# Patient Record
Sex: FEMALE | Race: WHITE | Employment: FULL TIME | URBAN - METROPOLITAN AREA
[De-identification: names, ages, dates, MRNs, and addresses within clinical notes are randomized per-mention and may not be internally consistent; named-entity substitution may affect disease eponyms.]

---

## 2024-09-03 NOTE — PROGRESS NOTES
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 She is here today with her mother. She unfortunately learned of the biopsy results via [employer] when they were just automatically released, oftentimes before the ordering physicians have even seen the results. She was able to have a conversation with Dr. Webb and then was referred on to GYN oncology. She is not interested in future fertility. She lives alone but will be able to have support from her parents during her postoperative recovery and will be able to stay with them. Patient's mother is present with her at today's visit. Pet therapy dog, Wild, was present during today's visit.    Genetic Testing  dMMR  p53 wild-type    Review of Systems:  A complete 14-point review of systems is negative except as noted in the above history of present illness.    Past Medical History:  Endometrial carcinoma  Abnormal uterine bleeding  Irregular menstrual cycle    Surgical History:   Denies any previous surgery    OB/Gyn History:    Menarche age 15.  Premenopausal  Reports irregular menstrual cycles that have worsened in irregularity.  Denies any history of abnormal pap smear.    Allergies#  NKA    Medications:       Family History:  Prostate cancer x 2: paternal uncle, alive                              maternal grandfather (and MDS). Now .    Social History:  Smoking Status: half pack of cigarettes use off and on throughout the  year.   Alcohol consumption: socially.  She lives alone in a townhouse in [place], [place].  She works in document control.     Health Maintenance:  Last pap smear: 2024  Last mammogram:   Last colonoscopy:     Vital Signs:  Blood pressure: 118/70, Pulse: 101, Temperature: 97 F, Respirations: 16, O2 sat: 97%, Pain Scale: 0, Height: 70 in, Weight: 276.6 lb, BSA: 2.4, BMI: 39.69 kg/m2    Physical Exam (Gyn Oncology):  General: alert, cooperative, no acute distress  HEENT: normocephalic, trachea midline, no thyromegaly  Lungs: no labored breathing on room air.  Cardiac: RRR  Abdomen: soft, non-distended, non-tender. No surgical scars.  Extremities: no edema, non-tender  Neurologic Exam: no focal deficits  Psych: normal mood and affect  Pelvic: deferred    Laboratory Data:  Endometrial biopsy on 2024:  Final diagnosis:   1. FIGO grade 2 endometrioid endometrial carcinoma.            Imaging:  CT scan on 2024:   IMPRESSION:  1. No acute findings in the chest, abdomen or pelvis.  2. Probable fibroid uterus.  3. Benign fat-containing lesion right adrenal gland.    Ultrasound pelvis on 2024:   Impression:   Uterus: Anteverted uterus measuring 7.3 x 4.3 x 4.8 cm. Lower uterine intramural fibroid measuring 1.1 x 1.0 x 0.8 cm. Left fundal intramural fibroid measuring 2.3 x 1.9 x 1.9 cm.  Endometrium: Endometrial stripe measures 13 mm. No endometrial mass or polyp identified.  Right ovary: Measures 2.3 x 1.2 x 1.0 cm. Intact blood flow. No suspicious lesions identified.  Left ovary: Measures 2.1 x 1.4 x 1.8 cm. Intact blood flow. No suspicious lesions identified.  No pelvic free fluid.    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    1. FIGO grade 2 endometrioid endometrial adenocarcinoma  (dMMR).  Briefly discussed natural history of endometrial cancer  Discussed hysterectomy as gold standard of care for endometrial cancer  We discussed the need for genetics referral for all women < 49 yo with new diagnosis of endometrial cancer. She is also dMMR. She warrants consultation with/without MLH1 promoter methylation results. A referral was placed at today's visit. Patient is very interested in pursuing genetic testing and is aware can do that at the time of her consultation (after the visit).   Discussed fertility preservation is only a possible alternative for precancer (EIN) and/or FIGO grade 1 endometrioid endometrial adenocarcinoma  Discussed DELBERT consultation / referral for other alternatives such as egg retrieval and future gestational carrier for genetic offspring, etc. Patient feels comfortable with proceeding with surgery. She is not interested in DELBERT consultation.  Discussed minimally invasive surgery with robotic approach.  Reviewed risks, benefits and alternatives to surgery for treatment of endometrial cancer  Reviewed alternatives for treatment can include medical management, but, this would NOT be advised with FIGO grade 2 histotype.   Discussed benefits including: therapeutic removal of cancer, ability to pathologically stage, ability to inform prognosis and ability to possibly inform future adjuvant therapies.  Discussed risks including, but not limited: bleeding, infection, injury to surrounding structures, postoperative pain/discomfort, future hernia formation, failure to cure, possible conversion to laparotomy in event of brisk bleeding and/or dense scar tissue, increased risk of blood clots, risk of lymphedema with removal of lymph nodes, permanent sterility and immediate/irreversible surgical menopause  Recovery from the surgical procedure discussed.  Postsurgical precautions discussed.  Patient completed formal surgical education with RN team at today's visit  Patient had the  "opportunity to meet with our surgical scheduling team at today's visit  We discussed permanent sterility. Aware she will no longer able to bear children, as well as undergo permanent surgical menopause.  We will await final pathology and final stage to determine if she can benefit from any future hormone replacement therapy. This could be with single agent estrogen in a transdermal formulation, but was advised of symptoms and detrimental health effects from menopause and was advised of permanent nature of removal of these organs and inability to bear future children.   She and her mother feel comfortable with the plan of care  All questions answered to her and her mother's satisfaction at today's visit    2. Medical comorbidities:  Personal history of endometrial carcinoma (new diagnosis), abnormal uterine bleeding, irregular menstrual cycle    3. Preoperative considerations        Diagnosis Code: C54.1  Surgery: \"robotic-assisted total laparoscopic hysterectomy, bilateral salpingo-oophorectomy, bilateral sentinel lymph node dissections, pelvic washings\"  Estimated Total Time: 120 minutes  Anesthesia: GENERAL  Alert Pathology for Frozen Section: NO  Bowel Prep: NO  Joint Case: NO  Patient Status: Same-Day Discharge  Preoperative Clearance Visit: YES  Labs on Day of Surgery: T&S & Hgb  Surgical Antibiotic Prophylaxis: IV Cefazolin 2 grams + IV Metronidazole 500 milligrams  VTE Prophylaxis: SQ Heparin 5,000 units + bilateral SCDs  Additional Considerations: will undergo surgical menopause - discussed possibility of postoperative HRT once final pathology is reviewed    Treatment Plan and Consent  Reviewed role of surgery for a definitive treatment and staging of endometrial cancer with the patient and her mother today. See counseling above. Specifically, the counseling included: goals of the treatment, prognosis, frequency, intended benefits, associated risks/harms and side effects and medically reasonable " alternatives.    I spent a total of 60 minutes of cumulative time in care of this patient, which included >50% of time spent in direct patient care which included patient interview, examination, and treatment counseling. The remainder of the time was spent in medical documentation, review of records including US / CT imaging, pathology report and care coordination.    I provided the patient an opportunity to ask questions and I answered all of their treatment related questions to the best of my ability. The patient expressed understanding and consent to this plan of care.      Pain Care Management:  Pain Scale: 0      The patient and family/friends if present were apprised of the use of [employer] remote documentation service and all parties consented to conducting the visit in this manner.    Documentation assistance provided by daniel Mason for Araceli Cabral MD on 07/31/2024.    I, Araceli Cabral MD,  personally performed the services described in this documentation, and it is both accurate and complete.      Araceli Cabral MD  Phone: [phone]  Fax: [phone]

## 2024-09-04 ENCOUNTER — ANESTHESIA EVENT (OUTPATIENT)
Dept: SURGERY | Facility: CLINIC | Age: 44
End: 2024-09-04
Payer: COMMERCIAL

## 2024-09-05 ENCOUNTER — ANESTHESIA (OUTPATIENT)
Dept: SURGERY | Facility: CLINIC | Age: 44
End: 2024-09-05
Payer: COMMERCIAL

## 2024-09-05 ENCOUNTER — HOSPITAL ENCOUNTER (OUTPATIENT)
Facility: CLINIC | Age: 44
Discharge: HOME | End: 2024-09-05
Attending: OBSTETRICS & GYNECOLOGY | Admitting: OBSTETRICS & GYNECOLOGY
Payer: COMMERCIAL

## 2024-09-05 VITALS
BODY MASS INDEX: 40.88 KG/M2 | TEMPERATURE: 98 F | HEART RATE: 80 BPM | RESPIRATION RATE: 16 BRPM | OXYGEN SATURATION: 97 % | SYSTOLIC BLOOD PRESSURE: 128 MMHG | HEIGHT: 69 IN | DIASTOLIC BLOOD PRESSURE: 70 MMHG | WEIGHT: 276 LBS

## 2024-09-05 DIAGNOSIS — C54.1 MALIGNANT NEOPLASM OF ENDOMETRIUM (H): Primary | ICD-10-CM

## 2024-09-05 LAB
ABO/RH(D): NORMAL
ANTIBODY SCREEN: NEGATIVE
HCG UR QL: NEGATIVE
HGB BLD-MCNC: 11.7 G/DL (ref 11.7–15.7)
SPECIMEN EXPIRATION DATE: NORMAL

## 2024-09-05 PROCEDURE — 258N000003 HC RX IP 258 OP 636: Performed by: NURSE ANESTHETIST, CERTIFIED REGISTERED

## 2024-09-05 PROCEDURE — 250N000011 HC RX IP 250 OP 636: Performed by: OBSTETRICS & GYNECOLOGY

## 2024-09-05 PROCEDURE — 272N000001 HC OR GENERAL SUPPLY STERILE: Performed by: OBSTETRICS & GYNECOLOGY

## 2024-09-05 PROCEDURE — 250N000011 HC RX IP 250 OP 636: Performed by: ANESTHESIOLOGY

## 2024-09-05 PROCEDURE — 38572 LAPAROSCOPY LYMPHADENECTOMY: CPT | Performed by: ANESTHESIOLOGY

## 2024-09-05 PROCEDURE — 86900 BLOOD TYPING SEROLOGIC ABO: CPT | Performed by: PHYSICIAN ASSISTANT

## 2024-09-05 PROCEDURE — 250N000011 HC RX IP 250 OP 636: Performed by: NURSE ANESTHETIST, CERTIFIED REGISTERED

## 2024-09-05 PROCEDURE — 710N000012 HC RECOVERY PHASE 2, PER MINUTE: Performed by: OBSTETRICS & GYNECOLOGY

## 2024-09-05 PROCEDURE — 250N000025 HC SEVOFLURANE, PER MIN: Performed by: OBSTETRICS & GYNECOLOGY

## 2024-09-05 PROCEDURE — 88305 TISSUE EXAM BY PATHOLOGIST: CPT | Mod: 26 | Performed by: PATHOLOGY

## 2024-09-05 PROCEDURE — 258N000003 HC RX IP 258 OP 636: Performed by: ANESTHESIOLOGY

## 2024-09-05 PROCEDURE — 258N000001 HC RX 258: Performed by: OBSTETRICS & GYNECOLOGY

## 2024-09-05 PROCEDURE — 88112 CYTOPATH CELL ENHANCE TECH: CPT | Mod: 26 | Performed by: PATHOLOGY

## 2024-09-05 PROCEDURE — 250N000013 HC RX MED GY IP 250 OP 250 PS 637: Performed by: PHYSICIAN ASSISTANT

## 2024-09-05 PROCEDURE — 999N000141 HC STATISTIC PRE-PROCEDURE NURSING ASSESSMENT: Performed by: OBSTETRICS & GYNECOLOGY

## 2024-09-05 PROCEDURE — 38572 LAPAROSCOPY LYMPHADENECTOMY: CPT | Performed by: NURSE ANESTHETIST, CERTIFIED REGISTERED

## 2024-09-05 PROCEDURE — 710N000009 HC RECOVERY PHASE 1, LEVEL 1, PER MIN: Performed by: OBSTETRICS & GYNECOLOGY

## 2024-09-05 PROCEDURE — 88307 TISSUE EXAM BY PATHOLOGIST: CPT | Mod: 26 | Performed by: STUDENT IN AN ORGANIZED HEALTH CARE EDUCATION/TRAINING PROGRAM

## 2024-09-05 PROCEDURE — 360N000080 HC SURGERY LEVEL 7, PER MIN: Performed by: OBSTETRICS & GYNECOLOGY

## 2024-09-05 PROCEDURE — 250N000009 HC RX 250: Performed by: OBSTETRICS & GYNECOLOGY

## 2024-09-05 PROCEDURE — 36415 COLL VENOUS BLD VENIPUNCTURE: CPT | Performed by: PHYSICIAN ASSISTANT

## 2024-09-05 PROCEDURE — 88309 TISSUE EXAM BY PATHOLOGIST: CPT | Mod: TC | Performed by: OBSTETRICS & GYNECOLOGY

## 2024-09-05 PROCEDURE — 88309 TISSUE EXAM BY PATHOLOGIST: CPT | Mod: 26 | Performed by: STUDENT IN AN ORGANIZED HEALTH CARE EDUCATION/TRAINING PROGRAM

## 2024-09-05 PROCEDURE — 85018 HEMOGLOBIN: CPT | Performed by: PHYSICIAN ASSISTANT

## 2024-09-05 PROCEDURE — 250N000011 HC RX IP 250 OP 636: Performed by: PHYSICIAN ASSISTANT

## 2024-09-05 PROCEDURE — 81025 URINE PREGNANCY TEST: CPT | Performed by: PHYSICIAN ASSISTANT

## 2024-09-05 PROCEDURE — 370N000017 HC ANESTHESIA TECHNICAL FEE, PER MIN: Performed by: OBSTETRICS & GYNECOLOGY

## 2024-09-05 PROCEDURE — 88305 TISSUE EXAM BY PATHOLOGIST: CPT | Mod: TC | Performed by: OBSTETRICS & GYNECOLOGY

## 2024-09-05 PROCEDURE — 250N000009 HC RX 250: Performed by: NURSE ANESTHETIST, CERTIFIED REGISTERED

## 2024-09-05 RX ORDER — HEPARIN SODIUM 5000 [USP'U]/.5ML
5000 INJECTION, SOLUTION INTRAVENOUS; SUBCUTANEOUS
Status: COMPLETED | OUTPATIENT
Start: 2024-09-05 | End: 2024-09-05

## 2024-09-05 RX ORDER — ONDANSETRON 2 MG/ML
4 INJECTION INTRAMUSCULAR; INTRAVENOUS EVERY 30 MIN PRN
Status: DISCONTINUED | OUTPATIENT
Start: 2024-09-05 | End: 2024-09-05 | Stop reason: HOSPADM

## 2024-09-05 RX ORDER — ONDANSETRON 4 MG/1
4 TABLET, ORALLY DISINTEGRATING ORAL EVERY 30 MIN PRN
Status: DISCONTINUED | OUTPATIENT
Start: 2024-09-05 | End: 2024-09-05 | Stop reason: HOSPADM

## 2024-09-05 RX ORDER — CEFAZOLIN SODIUM/WATER 2 G/20 ML
2 SYRINGE (ML) INTRAVENOUS
Status: COMPLETED | OUTPATIENT
Start: 2024-09-05 | End: 2024-09-05

## 2024-09-05 RX ORDER — OXYCODONE HYDROCHLORIDE 5 MG/1
10 TABLET ORAL
Status: DISCONTINUED | OUTPATIENT
Start: 2024-09-05 | End: 2024-09-05 | Stop reason: HOSPADM

## 2024-09-05 RX ORDER — OXYCODONE HYDROCHLORIDE 5 MG/1
5-10 TABLET ORAL EVERY 4 HOURS PRN
Qty: 10 TABLET | Refills: 0 | Status: SHIPPED | OUTPATIENT
Start: 2024-09-05

## 2024-09-05 RX ORDER — PROPOFOL 10 MG/ML
INJECTION, EMULSION INTRAVENOUS CONTINUOUS PRN
Status: DISCONTINUED | OUTPATIENT
Start: 2024-09-05 | End: 2024-09-05

## 2024-09-05 RX ORDER — FENTANYL CITRATE 0.05 MG/ML
50 INJECTION, SOLUTION INTRAMUSCULAR; INTRAVENOUS EVERY 5 MIN PRN
Status: DISCONTINUED | OUTPATIENT
Start: 2024-09-05 | End: 2024-09-05 | Stop reason: HOSPADM

## 2024-09-05 RX ORDER — OXYCODONE HYDROCHLORIDE 5 MG/1
5 TABLET ORAL
Status: COMPLETED | OUTPATIENT
Start: 2024-09-05 | End: 2024-09-05

## 2024-09-05 RX ORDER — ACETAMINOPHEN 325 MG/1
975 TABLET ORAL ONCE
Status: COMPLETED | OUTPATIENT
Start: 2024-09-05 | End: 2024-09-05

## 2024-09-05 RX ORDER — HYDROMORPHONE HCL IN WATER/PF 6 MG/30 ML
0.2 PATIENT CONTROLLED ANALGESIA SYRINGE INTRAVENOUS EVERY 5 MIN PRN
Status: DISCONTINUED | OUTPATIENT
Start: 2024-09-05 | End: 2024-09-05 | Stop reason: HOSPADM

## 2024-09-05 RX ORDER — MEPERIDINE HYDROCHLORIDE 25 MG/ML
12.5 INJECTION INTRAMUSCULAR; INTRAVENOUS; SUBCUTANEOUS EVERY 5 MIN PRN
Status: DISCONTINUED | OUTPATIENT
Start: 2024-09-05 | End: 2024-09-05 | Stop reason: HOSPADM

## 2024-09-05 RX ORDER — ONDANSETRON 2 MG/ML
INJECTION INTRAMUSCULAR; INTRAVENOUS PRN
Status: DISCONTINUED | OUTPATIENT
Start: 2024-09-05 | End: 2024-09-05

## 2024-09-05 RX ORDER — NALOXONE HYDROCHLORIDE 0.4 MG/ML
0.1 INJECTION, SOLUTION INTRAMUSCULAR; INTRAVENOUS; SUBCUTANEOUS
Status: DISCONTINUED | OUTPATIENT
Start: 2024-09-05 | End: 2024-09-05 | Stop reason: HOSPADM

## 2024-09-05 RX ORDER — DEXAMETHASONE SODIUM PHOSPHATE 4 MG/ML
4 INJECTION, SOLUTION INTRA-ARTICULAR; INTRALESIONAL; INTRAMUSCULAR; INTRAVENOUS; SOFT TISSUE
Status: DISCONTINUED | OUTPATIENT
Start: 2024-09-05 | End: 2024-09-05 | Stop reason: HOSPADM

## 2024-09-05 RX ORDER — CEFAZOLIN SODIUM/WATER 2 G/20 ML
2 SYRINGE (ML) INTRAVENOUS SEE ADMIN INSTRUCTIONS
Status: DISCONTINUED | OUTPATIENT
Start: 2024-09-05 | End: 2024-09-05 | Stop reason: HOSPADM

## 2024-09-05 RX ORDER — PROPOFOL 10 MG/ML
INJECTION, EMULSION INTRAVENOUS PRN
Status: DISCONTINUED | OUTPATIENT
Start: 2024-09-05 | End: 2024-09-05

## 2024-09-05 RX ORDER — OXYCODONE HYDROCHLORIDE 5 MG/1
5 TABLET ORAL
Status: DISCONTINUED | OUTPATIENT
Start: 2024-09-05 | End: 2024-09-05 | Stop reason: HOSPADM

## 2024-09-05 RX ORDER — INDOCYANINE GREEN AND WATER 25 MG
KIT INJECTION PRN
Status: DISCONTINUED | OUTPATIENT
Start: 2024-09-05 | End: 2024-09-05 | Stop reason: HOSPADM

## 2024-09-05 RX ORDER — BUPIVACAINE HYDROCHLORIDE 5 MG/ML
INJECTION, SOLUTION EPIDURAL; INTRACAUDAL
Status: DISCONTINUED
Start: 2024-09-05 | End: 2024-09-05 | Stop reason: HOSPADM

## 2024-09-05 RX ORDER — BUPIVACAINE HYDROCHLORIDE 5 MG/ML
INJECTION, SOLUTION EPIDURAL; INTRACAUDAL PRN
Status: DISCONTINUED | OUTPATIENT
Start: 2024-09-05 | End: 2024-09-05 | Stop reason: HOSPADM

## 2024-09-05 RX ORDER — LIDOCAINE HYDROCHLORIDE 20 MG/ML
INJECTION, SOLUTION INFILTRATION; PERINEURAL PRN
Status: DISCONTINUED | OUTPATIENT
Start: 2024-09-05 | End: 2024-09-05

## 2024-09-05 RX ORDER — HYDROMORPHONE HCL IN WATER/PF 6 MG/30 ML
0.4 PATIENT CONTROLLED ANALGESIA SYRINGE INTRAVENOUS EVERY 5 MIN PRN
Status: DISCONTINUED | OUTPATIENT
Start: 2024-09-05 | End: 2024-09-05 | Stop reason: HOSPADM

## 2024-09-05 RX ORDER — FENTANYL CITRATE 0.05 MG/ML
50 INJECTION, SOLUTION INTRAMUSCULAR; INTRAVENOUS
Status: DISCONTINUED | OUTPATIENT
Start: 2024-09-05 | End: 2024-09-05 | Stop reason: HOSPADM

## 2024-09-05 RX ORDER — FENTANYL CITRATE 50 UG/ML
INJECTION, SOLUTION INTRAMUSCULAR; INTRAVENOUS PRN
Status: DISCONTINUED | OUTPATIENT
Start: 2024-09-05 | End: 2024-09-05

## 2024-09-05 RX ORDER — MAGNESIUM HYDROXIDE 1200 MG/15ML
LIQUID ORAL PRN
Status: DISCONTINUED | OUTPATIENT
Start: 2024-09-05 | End: 2024-09-05 | Stop reason: HOSPADM

## 2024-09-05 RX ORDER — KETOROLAC TROMETHAMINE 30 MG/ML
INJECTION, SOLUTION INTRAMUSCULAR; INTRAVENOUS PRN
Status: DISCONTINUED | OUTPATIENT
Start: 2024-09-05 | End: 2024-09-05

## 2024-09-05 RX ORDER — SODIUM CHLORIDE, SODIUM LACTATE, POTASSIUM CHLORIDE, CALCIUM CHLORIDE 600; 310; 30; 20 MG/100ML; MG/100ML; MG/100ML; MG/100ML
INJECTION, SOLUTION INTRAVENOUS CONTINUOUS PRN
Status: DISCONTINUED | OUTPATIENT
Start: 2024-09-05 | End: 2024-09-05

## 2024-09-05 RX ORDER — METRONIDAZOLE 500 MG/100ML
500 INJECTION, SOLUTION INTRAVENOUS
Status: COMPLETED | OUTPATIENT
Start: 2024-09-05 | End: 2024-09-05

## 2024-09-05 RX ORDER — AMOXICILLIN 250 MG
1-2 CAPSULE ORAL 2 TIMES DAILY PRN
Qty: 30 TABLET | Refills: 0 | Status: SHIPPED | OUTPATIENT
Start: 2024-09-05

## 2024-09-05 RX ORDER — SODIUM CHLORIDE, SODIUM LACTATE, POTASSIUM CHLORIDE, CALCIUM CHLORIDE 600; 310; 30; 20 MG/100ML; MG/100ML; MG/100ML; MG/100ML
INJECTION, SOLUTION INTRAVENOUS CONTINUOUS
Status: DISCONTINUED | OUTPATIENT
Start: 2024-09-05 | End: 2024-09-05 | Stop reason: HOSPADM

## 2024-09-05 RX ORDER — FENTANYL CITRATE 0.05 MG/ML
25 INJECTION, SOLUTION INTRAMUSCULAR; INTRAVENOUS EVERY 5 MIN PRN
Status: DISCONTINUED | OUTPATIENT
Start: 2024-09-05 | End: 2024-09-05 | Stop reason: HOSPADM

## 2024-09-05 RX ORDER — DEXAMETHASONE SODIUM PHOSPHATE 4 MG/ML
INJECTION, SOLUTION INTRA-ARTICULAR; INTRALESIONAL; INTRAMUSCULAR; INTRAVENOUS; SOFT TISSUE PRN
Status: DISCONTINUED | OUTPATIENT
Start: 2024-09-05 | End: 2024-09-05

## 2024-09-05 RX ADMIN — DEXAMETHASONE SODIUM PHOSPHATE 4 MG: 4 INJECTION, SOLUTION INTRA-ARTICULAR; INTRALESIONAL; INTRAMUSCULAR; INTRAVENOUS; SOFT TISSUE at 11:30

## 2024-09-05 RX ADMIN — ROCURONIUM BROMIDE 50 MG: 50 INJECTION, SOLUTION INTRAVENOUS at 11:14

## 2024-09-05 RX ADMIN — ROCURONIUM BROMIDE 20 MG: 50 INJECTION, SOLUTION INTRAVENOUS at 11:47

## 2024-09-05 RX ADMIN — FENTANYL CITRATE 100 MCG: 50 INJECTION INTRAMUSCULAR; INTRAVENOUS at 11:06

## 2024-09-05 RX ADMIN — HYDROMORPHONE HYDROCHLORIDE 0.4 MG: 0.2 INJECTION, SOLUTION INTRAMUSCULAR; INTRAVENOUS; SUBCUTANEOUS at 15:05

## 2024-09-05 RX ADMIN — SUGAMMADEX 200 MG: 100 INJECTION, SOLUTION INTRAVENOUS at 13:18

## 2024-09-05 RX ADMIN — MEPERIDINE HYDROCHLORIDE 12.5 MG: 25 INJECTION INTRAMUSCULAR; INTRAVENOUS; SUBCUTANEOUS at 14:10

## 2024-09-05 RX ADMIN — ACETAMINOPHEN 975 MG: 325 TABLET ORAL at 09:01

## 2024-09-05 RX ADMIN — LIDOCAINE HYDROCHLORIDE 80 MG: 20 INJECTION, SOLUTION INFILTRATION; PERINEURAL at 11:14

## 2024-09-05 RX ADMIN — HYDROMORPHONE HYDROCHLORIDE 0.5 MG: 1 INJECTION, SOLUTION INTRAMUSCULAR; INTRAVENOUS; SUBCUTANEOUS at 11:55

## 2024-09-05 RX ADMIN — PROPOFOL 100 MCG/KG/MIN: 10 INJECTION, EMULSION INTRAVENOUS at 11:15

## 2024-09-05 RX ADMIN — SODIUM CHLORIDE, POTASSIUM CHLORIDE, SODIUM LACTATE AND CALCIUM CHLORIDE: 600; 310; 30; 20 INJECTION, SOLUTION INTRAVENOUS at 12:46

## 2024-09-05 RX ADMIN — SODIUM CHLORIDE, POTASSIUM CHLORIDE, SODIUM LACTATE AND CALCIUM CHLORIDE: 600; 310; 30; 20 INJECTION, SOLUTION INTRAVENOUS at 11:06

## 2024-09-05 RX ADMIN — PHENYLEPHRINE HYDROCHLORIDE 100 MCG: 10 INJECTION INTRAVENOUS at 11:42

## 2024-09-05 RX ADMIN — SODIUM CHLORIDE, POTASSIUM CHLORIDE, SODIUM LACTATE AND CALCIUM CHLORIDE: 600; 310; 30; 20 INJECTION, SOLUTION INTRAVENOUS at 15:18

## 2024-09-05 RX ADMIN — ACETAMINOPHEN 975 MG: 325 TABLET ORAL at 15:09

## 2024-09-05 RX ADMIN — HEPARIN SODIUM 5000 UNITS: 5000 INJECTION, SOLUTION INTRAVENOUS; SUBCUTANEOUS at 09:02

## 2024-09-05 RX ADMIN — METRONIDAZOLE 500 MG: 500 INJECTION, SOLUTION INTRAVENOUS at 09:13

## 2024-09-05 RX ADMIN — PROPOFOL 200 MG: 10 INJECTION, EMULSION INTRAVENOUS at 11:14

## 2024-09-05 RX ADMIN — HYDROMORPHONE HYDROCHLORIDE 0.4 MG: 0.2 INJECTION, SOLUTION INTRAMUSCULAR; INTRAVENOUS; SUBCUTANEOUS at 15:13

## 2024-09-05 RX ADMIN — ROCURONIUM BROMIDE 30 MG: 50 INJECTION, SOLUTION INTRAVENOUS at 12:52

## 2024-09-05 RX ADMIN — Medication 2 G: at 11:06

## 2024-09-05 RX ADMIN — MIDAZOLAM 2 MG: 1 INJECTION INTRAMUSCULAR; INTRAVENOUS at 11:06

## 2024-09-05 RX ADMIN — PHENYLEPHRINE HYDROCHLORIDE 100 MCG: 10 INJECTION INTRAVENOUS at 11:32

## 2024-09-05 RX ADMIN — PROPOFOL 100 MCG/KG/MIN: 10 INJECTION, EMULSION INTRAVENOUS at 12:02

## 2024-09-05 RX ADMIN — KETOROLAC TROMETHAMINE 30 MG: 30 INJECTION, SOLUTION INTRAMUSCULAR at 13:18

## 2024-09-05 RX ADMIN — ONDANSETRON 4 MG: 2 INJECTION INTRAMUSCULAR; INTRAVENOUS at 11:52

## 2024-09-05 RX ADMIN — OXYCODONE HYDROCHLORIDE 5 MG: 5 TABLET ORAL at 15:08

## 2024-09-05 ASSESSMENT — ACTIVITIES OF DAILY LIVING (ADL)
ADLS_ACUITY_SCORE: 35

## 2024-09-05 ASSESSMENT — LIFESTYLE VARIABLES: TOBACCO_USE: 1

## 2024-09-05 ASSESSMENT — ENCOUNTER SYMPTOMS
DYSRHYTHMIAS: 0
SEIZURES: 0

## 2024-09-05 NOTE — ANESTHESIA PROCEDURE NOTES
Airway       Patient location during procedure: OR ([facility] - Operating Room or Procedural Area)       Procedure Start/Stop Times: 9/5/2024 11:17 AM  Staff -        Anesthesiologist:  Darien Landry MD       CRNA: Neena Howard APRN CRNA       Performed By: CRNA  Consent for Airway        Urgency: elective  Indications and Patient Condition       Indications for airway management: noelle-procedural       Induction type:intravenous       Mask difficulty assessment: 2 - vent by mask + OA or adjuvant +/- NMBA    Final Airway Details       Final airway type: endotracheal airway       Successful airway: ETT - single  Endotracheal Airway Details        ETT size (mm): 7.0       Cuffed: yes       Successful intubation technique: direct laryngoscopy       DL Blade Type: Hubbard 2       Grade View of Cords: 1       Adjucts: stylet       Position: Right       Measured from: gums/teeth       Secured at (cm): 21       Bite block used: None    Post intubation assessment        Number of attempts at approach: 1       Number of other approaches attempted: 0       Secured with: tape       Ease of procedure: easy       Dentition: Intact and Unchanged    Medication(s) Administered   Medication Administration Time: 9/5/2024 11:17 AM

## 2024-09-05 NOTE — BRIEF OP NOTE
POST OPERATIVE NOTE-IMMEDIATE :    Procedures:  Robotic-assisted total laparoscopic hysterectomy, bilateral salpingo-oophorectomy, intraoperative sentinel lymph node mapping, bilateral sentinel pelvic lymph node dissections, pelvic washings    Preoperative Diagnosis:  Malignant neoplasm of endometrium (H) [C54.1]    Postoperative Diagnosis:  Same    Prosthetic Devices:  None    Surgeon(s) and Assistants (if any):  Surgeon(s):  Araceli Cabral MD Mallen, Adrianne R, MD Trace, Eva Muse PA-C  Circulator: Rebecca Spence RN  Relief Circulator: Argelia Godinez RN; Marya Nielsen RN  Relief Scrub: Kash Morel  Scrub Person: Patsy Winn; Brandi Mantilla    Anesthesia:  General    Estimated Blood Loss: 10 cc    IV Fluids: 1400 ml crystalloid    Urine Output: 50 ml clear urine     Drains: None    Specimens:    ID Type Source Tests Collected by Time Destination   1 : Pelvic washing Washings Pelvis NON-GYNECOLOGIC CYTOLOGY Araceli Cabral MD 9/5/2024 11:45 AM    2 : Right sentinel pelvic lymph node Tissue Lymph Node(s), [place] SURGICAL PATHOLOGY EXAM Araceli Cabral MD 9/5/2024 12:17 PM    3 : Left sentinel pelvic lymph node Tissue Lymph Node(s), [place] SURGICAL PATHOLOGY EXAM Araceli Cabral MD 9/5/2024 12:17 PM    4 : Uterus, Cervix, Bilateral Fallopian Tubes & Ovaries Tissue Uterus, Cervix, Bilateral Fallopian Tubes & Ovaries SURGICAL PATHOLOGY EXAM Araceli Cabral MD 9/5/2024 12:50 PM        Complications: None    Findings/Conclusions:  On pelvic examination under anesthesia, bilateral vulva were without masses or lesions. The vaginal mucosa was well-estrogenized and without any gross abnormalities. The cervix was normal-appearing without masses or lesions. There was some active bleeding noted from cervical os. The uterus sounded to 8 cm.     On laparoscopy, the uterus was moderately enlarged with a visible mass ~3 x 3 cm, grossly consistent with subserosal fibroid. Bilateral  adnexa were healthy-appearing for her premenopausal status without any obvious lesions. The bladder and posterior cul-de-sac were smooth. There was no free fluid. Pelvic washings were collected. Bilateral ureters were vermiculating throughout the case and without evidence of hydronephrosis. [place] lymph nodes mapped, bilaterally, with right sentinel pelvic lymph node located at the mid-common external iliac artery and left sentinel pelvic lymph node located at the obturator space. There was no obvious bulky retroperitoneal adenopathy.     The peritoneal surfaces were smooth, including bilateral hemidiaphragms. The liver edge, gallbladder, stomach and omentum were grossly normal. The small bowel, appendix and colon were without any obvious lesions or gross abnormalities.     Surgicell x 2 was placed over the surgical dissection sites. There was excellent hemostasis at the end of the robotic portion of the case.    The vaginal cuff was palpably intact at the conclusion of the case. There was bleeding noted, vaginally, from intact hymenal ring that was disrupted during specimen removal. This was repaired superficially, in the usual fashion. There were no deep lacerations. There were no sulcal lacerations. There was good hemostasis from the vaginal area, at the end of the procedure.    Condition on discharge from OR:  Satisfactory    Araceli Cabral MD  Gynecologic Oncology  [facility]  [facility]  09/05/2024

## 2024-09-05 NOTE — ANESTHESIA POSTPROCEDURE EVALUATION
Patient: Cherri Ceron    Procedure: Procedure(s):  robotic-assisted total laparoscopic hysterectomy, bilateral salpingo-oophorectomy, intraoperative sentinel lymph node mapping, bilateral sentinel pelvic lymph node dissections, pelvic washings       Anesthesia Type:  General    Note:  Disposition: Outpatient   Postop Pain Control: Uneventful            Sign Out: Well controlled pain   PONV: No   Neuro/Psych: Uneventful            Sign Out: Acceptable/Baseline neuro status   Airway/Respiratory: Uneventful            Sign Out: Acceptable/Baseline resp. status   CV/Hemodynamics: Uneventful            Sign Out: Acceptable CV status   Other NRE: NONE   DID A NON-ROUTINE EVENT OCCUR? No           Last vitals:  Vitals Value Taken Time   /55 09/05/24 1515   Temp 36.2  C (97.2  F) 09/05/24 1415   Pulse 72 09/05/24 1520   Resp 17 09/05/24 1520   SpO2 97 % 09/05/24 1520   Vitals shown include unfiled device data.    Electronically Signed By: Darien Landry MD  September 5, 2024  4:13 PM

## 2024-09-05 NOTE — ANESTHESIA CARE TRANSFER NOTE
Patient: Cherri Ceron    Procedure: Procedure(s):  robotic-assisted total laparoscopic hysterectomy, bilateral salpingo-oophorectomy, intraoperative sentinel lymph node mapping, bilateral sentinel pelvic lymph node dissections, pelvic washings       Diagnosis: Malignant neoplasm of endometrium (H) [C54.1]  Diagnosis Additional Information: No value filed.    Anesthesia Type:   General     Note:    Oropharynx: oral airway in place  Level of Consciousness: drowsy  Oxygen Supplementation: face mask  Level of Supplemental Oxygen (L/min / FiO2): 6  Independent Airway: airway patency satisfactory and stable  Dentition: dentition unchanged  Vital Signs Stable: post-procedure vital signs reviewed and stable  Report to RN Given: handoff report given  Patient transferred to: PACU    Handoff Report: Identifed the Patient, Identified the Reponsible Provider, Reviewed the pertinent medical history, Discussed the surgical course, Reviewed Intra-OP anesthesia mangement and issues during anesthesia, Set expectations for post-procedure period and Allowed opportunity for questions and acknowledgement of understanding  Vitals:  Vitals Value Taken Time   BP     Temp     Pulse     Resp     SpO2         Electronically Signed By: EFREN Duran CRNA  September 5, 2024  1:57 PM

## 2024-09-05 NOTE — OP NOTE
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 She is not interested in future fertility. She has been referred to genetics counseling given endometrial cancer diagnosis < 50 years old. She desired to proceed with definitive management and treatment.    FINDINGS: On pelvic examination under anesthesia, bilateral vulva were without masses or lesions. The vaginal mucosa was well-estrogenized and without any gross abnormalities. The cervix was normal-appearing without masses or lesions. There was some active bleeding noted from cervical os. The uterus sounded to 8 cm.      On laparoscopy, the uterus was moderately enlarged with a visible mass ~3 x 3 cm, grossly consistent with subserosal fibroid. Bilateral adnexa were healthy-appearing for her premenopausal status without any obvious lesions. The bladder and posterior cul-de-sac were smooth. There was no free fluid. Pelvic washings were collected. Bilateral ureters were vermiculating throughout the case and without evidence of hydronephrosis. [place] lymph nodes mapped, bilaterally, with right sentinel pelvic lymph node located at the mid-common external iliac artery and left sentinel pelvic lymph node located at the obturator space. There was no  obvious bulky retroperitoneal adenopathy.      The peritoneal surfaces were smooth, including bilateral hemidiaphragms. The liver edge, gallbladder, stomach and omentum were grossly normal. The small bowel, appendix and colon were without any obvious lesions or gross abnormalities.      Surgicell x 2 was placed over the surgical dissection sites. There was excellent hemostasis at the end of the robotic portion of the case.     The vaginal cuff was palpably intact at the conclusion of the case. There was bleeding noted, vaginally, from intact hymenal ring that was disrupted during specimen removal. This was repaired superficially, in the usual fashion. There were no deep lacerations. There were no sulcal lacerations. There was good hemostasis from the vaginal area, at the end of the procedure.     SPECIMENS:   ID Type Source Tests Collected by Time Destination   1 : Pelvic washing Washings Pelvis NON-GYNECOLOGIC CYTOLOGY Araceli Cabral MD 9/5/2024 11:45 AM    2 : Right sentinel pelvic lymph node Tissue Lymph Node(s), [place] SURGICAL PATHOLOGY EXAM Araceli Cabral MD 9/5/2024 12:17 PM    3 : Left sentinel pelvic lymph node Tissue Lymph Node(s), [place] SURGICAL PATHOLOGY EXAM Araceli Cabral MD 9/5/2024 12:17 PM    4 : Uterus, Cervix, Bilateral Fallopian Tubes & Ovaries Tissue Uterus, Cervix, Bilateral Fallopian Tubes & Ovaries SURGICAL PATHOLOGY EXAM Araceli Cabral MD 9/5/2024 12:50 PM        COMPLICATIONS: None.    CONDITION: Stable to PACU.    OPERATIVE PROCEDURE IN DETAIL: Consent was reviewed with the patient in the preoperative setting and confirmed. She received prophylactic antibiotics with IV Cefazolin 2 grams and IV Metronidazole 500 milligrams. In addition, she received prophylactic SQ Heparin 5,000 units and bilateral sequential compression devices for venous thromboembolism prevention. A surgical debriefing was performed with the operating room team prior to patient entry into the  operating room. The patient was transferred to the operating room and placed in dorsal supine position. General anesthetic was obtained in the usual manner without noted difficulties. The patient was then positioned onto Alessio stirrups. The patient was prepped and draped for the above-mentioned procedure.    Timeout was called at which point the patient's name, procedure and operative site was confirmed by the operative team. The abdominal wall was elevated and the Veress needle introduced through the base of the umbilicus. The opening pressure was high at 10 mmHg and location changed to Villalba's point. The Veress needle was introduced at Villalba's point and opening pressure was noted to be 3 mmHg. The abdomen was insufflated. The Veress needle was removed. Sites for the da Jazmín XI laparoscopic ports were demarcated, total of 5, initiating' midline approximately 4 cm above the umbilicus and positioned in a straight line around the upper abdomen. The initial Villalba's point 8 mm port was inserted without any issues. Previous Veress attempt was noted with some mild insufflation of greater omental tissue, very superficially. The remaining 4 ports were placed under direct visualization. Left lateral port and midline port were placed with 8 mm da Jazmín XI robotic ports x 2. Additional 8 mm da Jazmín XI robotic port was placed along medial right side x 1. The right lateral most port was 8 mm AirSeal port. CO2 insufflation was switched over to AirSeal mode. At this point, the patient was placed into steep Trendelenburg. The pelvis was inspected as well as the upper abdomen as noted above. Bowels were packed up into the upper abdomen with gentle traction. Next, the Tse catheter was placed under sterile technique. A speculum was placed in the vagina and instruments for the uterine manipulator were positioned. The anterior lip of the cervix was grasped. Next, the uterus sounded to 8 cm, and cervix was serially dilated.  "Indocyanine green (ICG) dye was injected superficially and deep at 3 o'clock and 9 o'clock. Approximately 1 cc was used x 4. The medium VCare uterine manipulator was inserted and the cervical cup affixed without any difficulty. The da Jazmín XI robot was docked onto the ports, and all appropriate instruments were inserted. Pelvic washings were collected and sent to surgical cytology for routine analysis.     The procedure was initiated by performing bilateral sentinel lymph node mapping. The da Jazmín XI firefly mode was utilized to denote the ICG uptake along lymphatic channels. A sentinel node was mapped bilaterally with further details above. We isolated the right round ligament, which was cauterized and transected using synchroseal device. The posterior leaf of the broad ligament was dissected. The right ureter was identified and noted to be vermiculating. A peritoneal window was made below the right infundibulopelvic ligament, well above the right ureter. The right IP ligament was multiply sealed and transected using the synchroseal device. The posterior peritoneum was dissected to the level of the posterior uterus with the right ureter in view at all times. The right ureter was retracted medially from the sentinel lymph node. The right external iliac artery and right external iliac vein were both identified. The right obturator nerve was identified. The node was carefully excised from these critical structures and removed via assistant port. This was labeled as \"right sentinel pelvic lymph node\" and sent to surgical pathology for routine analysis. The same process was repeated on the left side with respective labeling of \"left sentinel pelvic lymph node\" and also sent for routine analysis. Some filmy adhesions of the sigmoid colon were taken down along the line of [place]t on the left side.     At this point, we initiated the hysterectomy by incising the vesicouterine peritoneum. The bladder flap was developed well " "at the upper vagina. The uterus had adequate mobility, bilateral uterine arteries could be isolated and these were cauterized with the synchroseal device and transected. Initially on the right, we extended along the cardinal and uterosacral ligaments, staying close to the cervix to the level of the upper vagina. This was cauterized and transected using the synchroseal device. Similarly, we isolated out the left cardinal ligament and uterosacral ligament which were cauterized and transected. At this point, we were able to palpate the line of the VCare cup at the level of the upper vagina in a circumferential manner. We incised along the upper vagina to resect the cervix and uterus. A 15 mm Endocatch bag was placed transvaginally and the specimen was placed inside the bag. The specimen was brought out through the vaginal opening, contained within the bag, and labeled \"uterus, cervix, bilateral fallopian tubes and ovaries\" and sent to surgical pathology for routine analysis.    A wet laparotomy sponge was inserted to obtain adequate insufflation. The vaginal cuff had been well-developed and clearly the bladder was out of the way. The vaginal cuff was closed with running V-lock suture with excellent re-approximation. The pelvis was inspected and copiously irrigated. Surgicell x 2 cc of was placed along the pelvic dissection beds. There was excellent hemostasis at the conclusion of the procedure.    All laparoscopic instruments and ports were now removed and CO2 allowed to escape from the abdomen. The Orgenesisi XI robot was undocked without incident. Skin was reapproximated with 4-0 Monocryl sutures and sterile skin glue applied.    Eva Dave PA-C, was present and assisted the entire procedure. She assisted with abdominal entry, port placement, docking of robotic arms, adequate visualization, retraction, uterine manipulation, bedside assisting via assistant port, surgical specimen handling/retrieval, undocking of " robotic arms, and skin closure.      The laparotomy sponge was removed from the vagina. The Tse catheter was removed from the bladder. There was vaginal bleeding noted. The vaginal cuff was palpably intact. There was bleeding from hymenal ring remnants that had been disrupted during specimen removal. There was a small midline perineal laceration that was part of the disrupted hymenal ring remnants. This was repaired in the usual fashion. There was good hemostasis, vaginally, at the end of the procedure.    All sponge, lap, needle and instrument counts were correct x 2. The patient tolerated the procedure well and there were no complications. She was returned to the supine position and general anesthesia was reversed without difficulty. She was taken to the recovery room in stable condition. I was present and scrubbed the entire procedure.    Araceli Cabral MD  Gynecologic Oncology  [facility]  09/05/2024

## 2024-09-05 NOTE — ANESTHESIA PREPROCEDURE EVALUATION
Anesthesia Pre-Procedure Evaluation    Patient: Cherri Ceron   MRN: [id number] : 1980        Procedure : Procedure(s):  robotic assisted total laparoscopic hysterectomy, bilateral salpingo-oophorectomy, bilateral sentinel lymph node dissections, pelvic washing          Past Medical History:   Diagnosis Date    Endometrial carcinoma (H)     Menorrhagia with irregular cycle       Past Surgical History:   Procedure Laterality Date    WISDOM TOOTH EXTRACTION        Allergies   Allergen Reactions    Ibuprofen Itching    Ambrosia Artemisiifolia (Ragweed) Skin Test      Seasonal allergies    Fish-Derived Products     Peanut Butter Os [Flavoring Agent]     Lactose Intolerance (Gi) Nausea, Diarrhea and GI Disturbance    Milk (Cow) Rash     Eczema      Social History     Tobacco Use    Smoking status: Every Day     Current packs/day: 0.50     Types: Cigarettes    Smokeless tobacco: Never   Substance Use Topics    Alcohol use: Not Currently     Comment: social      Wt Readings from Last 1 Encounters:   24 125.2 kg (276 lb)        Anesthesia Evaluation   Pt has had prior anesthetic. Type: MAC.    No history of anesthetic complications       ROS/MED HX  ENT/Pulmonary:     (+)                tobacco use, Current use,                    (-) asthma and sleep apnea   Neurologic:     (+)    no peripheral neuropathy                         (-) no seizures and no CVA   Cardiovascular:    (-) hypertension, CAD and arrhythmias   METS/Exercise Tolerance: >4 METS    Hematologic:       Musculoskeletal:       GI/Hepatic:    (-) GERD   Renal/Genitourinary:    (-) renal disease   Endo:     (+)               Obesity,    (-) Type II DM and thyroid disease   Psychiatric/Substance Use:     (+) psychiatric history anxiety       Infectious Disease:    (-) Recent Fever   Malignancy: Comment: Malignant neoplasm of endometrium (H) [C54.1]        Other:            Physical Exam    Airway  airway exam normal      Mallampati: II   TM  "distance: > 3 FB   Neck ROM: full   Mouth opening: > 3 cm    Respiratory Devices and Support         Dental       (+) Minor Abnormalities - some fillings, tiny chips      Cardiovascular   cardiovascular exam normal          Pulmonary   pulmonary exam normal                OUTSIDE LABS:  CBC:   Lab Results   Component Value Date    HGB 11.7 09/05/2024     BMP: No results found for: \"NA\", \"POTASSIUM\", \"CHLORIDE\", \"CO2\", \"BUN\", \"CR\", \"GLC\"  COAGS: No results found for: \"PTT\", \"INR\", \"FIBR\"  POC:   Lab Results   Component Value Date    HCG Negative 09/05/2024     HEPATIC: No results found for: \"ALBUMIN\", \"PROTTOTAL\", \"ALT\", \"AST\", \"GGT\", \"ALKPHOS\", \"BILITOTAL\", \"BILIDIRECT\", \"SHERWIN\"  OTHER: No results found for: \"PH\", \"LACT\", \"A1C\", \"TANGELA\", \"PHOS\", \"MAG\", \"LIPASE\", \"AMYLASE\", \"TSH\", \"T4\", \"T3\", \"CRP\", \"SED\"    Anesthesia Plan    ASA Status:  2    NPO Status:  NPO Appropriate    Anesthesia Type: General.     - Airway: ETT   Induction: Intravenous.   Maintenance: Balanced.        Consents    Anesthesia Plan(s) and associated risks, benefits, and realistic alternatives discussed. Questions answered and patient/representative(s) expressed understanding.     - Discussed:     - Discussed with:  Patient            Postoperative Care    Pain management: IV analgesics, Oral pain medications.   PONV prophylaxis: Ondansetron (or other 5HT-3), Dexamethasone or Solumedrol, Background Propofol Infusion     Comments:               Darien Landry MD    I have reviewed the pertinent notes and labs in the chart from the past 30 days and (re)examined the patient.  Any updates or changes from those notes are reflected in this note.              # Severe Obesity: Estimated body mass index is 40.76 kg/m  as calculated from the following:    Height as of this encounter: 1.753 m (5' 9\").    Weight as of this encounter: 125.2 kg (276 lb).      "

## 2024-09-05 NOTE — DISCHARGE INSTRUCTIONS
Today you received Toradol, an antiinflammatory medication similar to Ibuprofen.  You should not take other antiinflammatory medication, such as Ibuprofen, Motrin, Advil, Aleve, Naprosyn, etc until 7:20pm.      Today you were given 975 mg of Tylenol at 9:00am and  3:00 pm. The recommended daily maximum dose is 4000 mg.  Take 650-1000 mg per dose as needed every 4 to 6 hours.     **If you have questions or concerns about your procedure,   call Dr. Cabral at [phone]**     Same Day Surgery Discharge Instructions for  Sedation and General Anesthesia     It's not unusual to feel dizzy, light-headed or faint for up to 24 hours after surgery or while taking pain medication.  If you have these symptoms: sit for a few minutes before standing and have someone assist you when you get up to walk or use the bathroom.    You should rest and relax for the next 24 hours. We recommend you make arrangements to have an adult stay with you for at least 24 hours after your discharge.  Avoid hazardous and strenuous activity.    DO NOT DRIVE any vehicle or operate mechanical equipment for 24 hours following the end of your surgery.  Even though you may feel normal, your reactions may be affected by the medication you have received.    Do not drink alcoholic beverages for 24 hours following surgery.     Slowly progress to your regular diet as you feel able. It's not unusual to feel nauseated and/or vomit after receiving anesthesia.  If you develop these symptoms, drink clear liquids (apple juice, ginger ale, broth, 7-up, etc. ) until you feel better.  If your nausea and vomiting persists for 24 hours, please notify your surgeon.      All narcotic pain medications, along with inactivity and anesthesia, can cause constipation. Drinking plenty of liquids and increasing fiber intake will help.    For any questions of a medical nature, call your surgeon.    Do not make important decisions for 24 hours.    If you had general anesthesia, you  may have a sore throat for a couple of days related to the breathing tube used during surgery.  You may use Cepacol lozenges to help with this discomfort.  If it worsens or if you develop a fever, contact your surgeon.     If you feel your pain is not well managed with the pain medications prescribed by your surgeon, please contact your surgeon's office to let them know so they can address your concerns.

## 2024-09-09 LAB
PATH REPORT.COMMENTS IMP SPEC: NORMAL
PATH REPORT.FINAL DX SPEC: NORMAL
PATH REPORT.FINAL DX SPEC: NORMAL
PATH REPORT.GROSS SPEC: NORMAL
PATH REPORT.GROSS SPEC: NORMAL
PATH REPORT.MICROSCOPIC SPEC OTHER STN: NORMAL
PATH REPORT.MICROSCOPIC SPEC OTHER STN: NORMAL
PATH REPORT.RELEVANT HX SPEC: NORMAL
PATHOLOGY SYNOPTIC REPORT: NORMAL
PHOTO IMAGE: NORMAL

## 2024-09-18 ENCOUNTER — DOCUMENTATION ONLY (OUTPATIENT)
Dept: OTHER | Facility: CLINIC | Age: 44
End: 2024-09-18
Payer: COMMERCIAL

## 2024-10-06 ENCOUNTER — HEALTH MAINTENANCE LETTER (OUTPATIENT)
Age: 44
End: 2024-10-06

## 2025-01-13 PROCEDURE — 81288 MLH1 GENE: CPT | Performed by: OBSTETRICS & GYNECOLOGY

## 2025-01-13 PROCEDURE — G0452 MOLECULAR PATHOLOGY INTERPR: HCPCS | Mod: 26 | Performed by: PATHOLOGY

## (undated) DEVICE — SUCTION IRR STRYKERFLOW II W/TIP 250-070-520

## (undated) DEVICE — BLADE KNIFE SURG 11 371111

## (undated) DEVICE — DRAPE IOBAN INCISE 23X17" 6650EZ

## (undated) DEVICE — CATH TRAY FOLEY SURESTEP 16FR WDRAIN BAG STLK LATEX A300316A

## (undated) DEVICE — ANTIFOG SOLUTION SEE SHARP 150M TROCAR SWABS 30978

## (undated) DEVICE — PACK DAVINCI GYN SMA15GDFS1

## (undated) DEVICE — SU VICRYL 3-0 SH 27" J316H

## (undated) DEVICE — DAVINCI XI TISSUE SEALER SYNCROSEAL 8MM 480440

## (undated) DEVICE — SPONGE LAP 18X18" X8435

## (undated) DEVICE — SOL WATER IRRIG 1000ML BOTTLE 2F7114

## (undated) DEVICE — BNDG ABDOMINAL BINDER 9X62-84" 79-89210

## (undated) DEVICE — SU VICRYL 2-0 CT-2 27" UND J269H

## (undated) DEVICE — CLEANER INST PRE-KLENZ SOAK SHIELD TUBE 6 ML MEDIUM 2D66J4

## (undated) DEVICE — RETR ELEV / UTERINE MANIPULATOR V-CARE MED CUP 60-6085-201

## (undated) DEVICE — DAVINCI XI MONOPOLAR SCISSORS HOT SHEARS 8MM 470179

## (undated) DEVICE — SU MONOCRYL 4-0 PS-2 18" UND Y496G

## (undated) DEVICE — DAVINCI XI OBTURATOR BLADELESS 8MM 470359

## (undated) DEVICE — SURGICEL POWDER ABSORBABLE HEMOSTAT 3GM 3013SP

## (undated) DEVICE — DAVINCI XI GRASPER ENDOWRIST PROGRASP 470093

## (undated) DEVICE — LINEN TOWEL PACK X5 5464

## (undated) DEVICE — DAVINCI HOT SHEARS TIP COVER  400180

## (undated) DEVICE — APPLICATOR ENDOSCOPIC 5 SURGICEL POWDER 3123SPEA

## (undated) DEVICE — SPONGE RAY-TEC 4X4" 7317

## (undated) DEVICE — ESU GROUND PAD UNIVERSAL W/O CORD

## (undated) DEVICE — GLOVE BIOGEL PI MICRO SZ 6.5 48565

## (undated) DEVICE — GLOVE BIOGEL PI MICRO INDICATOR UNDERGLOVE SZ 6.5 48965

## (undated) DEVICE — DAVINCI XI ESU FCP BIPOLAR MARYLAND 470172

## (undated) DEVICE — SU DERMABOND ADVANCED .7ML DNX12

## (undated) DEVICE — DAVINCI XI HANDPIECE ESU VESSEL SEALER 8MM EXT 480422

## (undated) DEVICE — POUCH TISSUE RETRIEVAL 15MM 6.00" INTRO TRS190SB2

## (undated) DEVICE — TUBING CONMED AIRSEAL SMOKE EVAC INSUFFLATION ASM-EVAC

## (undated) DEVICE — SOL NACL 0.9% INJ 1000ML BAG 2B1324X

## (undated) DEVICE — SYR 10ML FINGER CONTROL W/O NDL 309695

## (undated) DEVICE — DECANTER VIAL 2006S

## (undated) DEVICE — NDL INSUFFLATION 13GA 120MM C2201

## (undated) DEVICE — DRAPE CV SPLIT II 147X106" 9158

## (undated) DEVICE — DAVINCI XI DRAPE ARM 470015

## (undated) DEVICE — DAVINCI XI NDL DRIVER LARGE 470006

## (undated) DEVICE — KIT PATIENT POSITIONING PIGAZZI LATEX FREE 40580

## (undated) DEVICE — SU WND CLOSURE VLOC 180 ABS 0 12" GS-21 VLOCL0316

## (undated) DEVICE — SOL NACL 0.9% IRRIG 1000ML BOTTLE 2F7124

## (undated) DEVICE — DAVINCI XI SEAL UNIVERSAL 5-12MM 470500

## (undated) DEVICE — DAVINCI XI DRAPE COLUMN 470341

## (undated) RX ORDER — MEPERIDINE HYDROCHLORIDE 25 MG/ML
INJECTION INTRAMUSCULAR; INTRAVENOUS; SUBCUTANEOUS
Status: DISPENSED
Start: 2024-09-05

## (undated) RX ORDER — OXYCODONE HYDROCHLORIDE 5 MG/1
TABLET ORAL
Status: DISPENSED
Start: 2024-09-05

## (undated) RX ORDER — DEXAMETHASONE SODIUM PHOSPHATE 4 MG/ML
INJECTION, SOLUTION INTRA-ARTICULAR; INTRALESIONAL; INTRAMUSCULAR; INTRAVENOUS; SOFT TISSUE
Status: DISPENSED
Start: 2024-09-05

## (undated) RX ORDER — HYDROMORPHONE HYDROCHLORIDE 1 MG/ML
INJECTION, SOLUTION INTRAMUSCULAR; INTRAVENOUS; SUBCUTANEOUS
Status: DISPENSED
Start: 2024-09-05

## (undated) RX ORDER — BUPIVACAINE HYDROCHLORIDE AND EPINEPHRINE 5; 5 MG/ML; UG/ML
INJECTION, SOLUTION EPIDURAL; INTRACAUDAL; PERINEURAL
Status: DISPENSED
Start: 2024-09-05

## (undated) RX ORDER — HYDROMORPHONE HCL IN WATER/PF 6 MG/30 ML
PATIENT CONTROLLED ANALGESIA SYRINGE INTRAVENOUS
Status: DISPENSED
Start: 2024-09-05

## (undated) RX ORDER — HEPARIN SODIUM 5000 [USP'U]/.5ML
INJECTION, SOLUTION INTRAVENOUS; SUBCUTANEOUS
Status: DISPENSED
Start: 2024-09-05

## (undated) RX ORDER — ONDANSETRON 2 MG/ML
INJECTION INTRAMUSCULAR; INTRAVENOUS
Status: DISPENSED
Start: 2024-09-05

## (undated) RX ORDER — ACETAMINOPHEN 325 MG/1
TABLET ORAL
Status: DISPENSED
Start: 2024-09-05

## (undated) RX ORDER — FENTANYL CITRATE 50 UG/ML
INJECTION, SOLUTION INTRAMUSCULAR; INTRAVENOUS
Status: DISPENSED
Start: 2024-09-05

## (undated) RX ORDER — PROPOFOL 10 MG/ML
INJECTION, EMULSION INTRAVENOUS
Status: DISPENSED
Start: 2024-09-05

## (undated) RX ORDER — INDOCYANINE GREEN AND WATER 25 MG
KIT INJECTION
Status: DISPENSED
Start: 2024-09-05

## (undated) RX ORDER — METRONIDAZOLE 500 MG/100ML
INJECTION, SOLUTION INTRAVENOUS
Status: DISPENSED
Start: 2024-09-05